# Patient Record
Sex: FEMALE | Race: WHITE | Employment: UNEMPLOYED | URBAN - NONMETROPOLITAN AREA
[De-identification: names, ages, dates, MRNs, and addresses within clinical notes are randomized per-mention and may not be internally consistent; named-entity substitution may affect disease eponyms.]

---

## 2022-07-17 ENCOUNTER — HOSPITAL ENCOUNTER (EMERGENCY)
Age: 27
Discharge: HOME | End: 2022-07-17
Payer: COMMERCIAL

## 2022-07-17 VITALS
DIASTOLIC BLOOD PRESSURE: 82 MMHG | RESPIRATION RATE: 20 BRPM | WEIGHT: 125 LBS | HEIGHT: 61 IN | OXYGEN SATURATION: 98 % | BODY MASS INDEX: 23.6 KG/M2 | HEART RATE: 70 BPM | SYSTOLIC BLOOD PRESSURE: 141 MMHG

## 2022-07-17 DIAGNOSIS — K04.7 DENTAL ABSCESS: Primary | ICD-10-CM

## 2022-07-17 PROCEDURE — 6370000000 HC RX 637 (ALT 250 FOR IP): Performed by: NURSE PRACTITIONER

## 2022-07-17 PROCEDURE — 41800 DRAINAGE OF GUM LESION: CPT

## 2022-07-17 PROCEDURE — 99283 EMERGENCY DEPT VISIT LOW MDM: CPT

## 2022-07-17 RX ORDER — LIDOCAINE HYDROCHLORIDE 20 MG/ML
15 SOLUTION OROPHARYNGEAL ONCE
Status: COMPLETED | OUTPATIENT
Start: 2022-07-17 | End: 2022-07-17

## 2022-07-17 RX ORDER — AMOXICILLIN AND CLAVULANATE POTASSIUM 875; 125 MG/1; MG/1
1 TABLET, FILM COATED ORAL 2 TIMES DAILY
Qty: 20 TABLET | Refills: 0 | Status: SHIPPED | OUTPATIENT
Start: 2022-07-17 | End: 2022-07-27

## 2022-07-17 RX ADMIN — LIDOCAINE HYDROCHLORIDE 15 ML: 20 SOLUTION ORAL; TOPICAL at 03:46

## 2022-07-17 ASSESSMENT — PAIN - FUNCTIONAL ASSESSMENT: PAIN_FUNCTIONAL_ASSESSMENT: 0-10

## 2022-07-17 ASSESSMENT — PAIN SCALES - GENERAL: PAINLEVEL_OUTOF10: 7

## 2022-07-17 NOTE — DISCHARGE INSTRUCTIONS
Salt water gargles and good oral hygiene. It is imperative that you see a dentist as soon as possible. Make sure you finish all of your antibiotics. Return for fever, inability to open your mouth, unable to swallow your secretions or any other concerns.

## 2022-07-17 NOTE — ED NOTES
Pt to ED via private with c/o dental pain from an abscess tooth. Pt states she started taking amoxicillin a couple of days ago and has been alternating ibuprofen and tylenol for pain.  Denies other needs     Shaan Mclean  07/17/22 [id number]

## 2022-07-18 ASSESSMENT — ENCOUNTER SYMPTOMS
COUGH: 0
CHEST TIGHTNESS: 0
ABDOMINAL PAIN: 0
VOMITING: 0
BACK PAIN: 0
RHINORRHEA: 0
NAUSEA: 0
EYE REDNESS: 0

## 2022-07-18 NOTE — ED PROVIDER NOTES
[facility] Emergency Department    CHIEF COMPLAINT       Chief Complaint   Patient presents with    Dental Pain       Nurses Notes reviewed and I agree except as noted in the HPI. HISTORY OF PRESENT ILLNESS    Phoebe Vega steven 32 y.o. female who presents to the ED for evaluation of left upper dental abscess. Patient states it feels like it is going to ruptures. No trismus. No fever. HPI was provided by the patient    REVIEW OF SYSTEMS     Review of Systems   Constitutional:  Negative for chills, fatigue and fever. HENT:  Positive for dental problem. Negative for congestion, ear discharge, ear pain, postnasal drip and rhinorrhea. Eyes:  Negative for redness. Respiratory:  Negative for cough and chest tightness. Cardiovascular:  Negative for chest pain and leg swelling. Gastrointestinal:  Negative for abdominal pain, nausea and vomiting. Genitourinary:  Negative for difficulty urinating, dysuria, enuresis, flank pain and hematuria. Musculoskeletal:  Negative for back pain and joint swelling. Skin:  Negative for rash. Neurological:  Negative for dizziness, light-headedness, numbness and headaches. Psychiatric/Behavioral:  Negative for agitation, behavioral problems and confusion. All other systems negative except as noted. PAST MEDICAL HISTORY     Past Medical History:   Diagnosis Date    Ketoacidosis        SURGICALHISTORY      has no past surgical history on file. CURRENT MEDICATIONS       Discharge Medication List as of 7/17/2022  4:15 AM        CONTINUE these medications which have NOT CHANGED    Details   brompheniramine-pseudoephedrine-DM 30-2-10 MG/5ML syrup Take 10 mLs by mouth 4 times daily as needed for Congestion or Cough, Disp-200 mL, R-0      Insulin Glargine (LANTUS SC) Inject  into the skin. Insulin Aspart (NOVOLOG SC) Inject  into the skin. OMEPRAZOLE by Does not apply route.                ALLERGIES     has No Known Allergies. FAMILY HISTORY     has no family status information on file. family history is not on file. SOCIAL HISTORY       Social History     Socioeconomic History    Marital status: Single     Spouse name: Not on file    Number of children: Not on file    Years of education: Not on file    Highest education level: Not on file   Occupational History    Not on file   Tobacco Use    Smoking status: Every Day     Packs/day: 0.50     Types: Cigarettes    Smokeless tobacco: Not on file   Substance and Sexual Activity    Alcohol use: No    Drug use: Not on file    Sexual activity: Not on file   Other Topics Concern    Not on file   Social History Narrative    Not on file     Social Determinants of Health     Financial Resource Strain: Not on file   Food Insecurity: Not on file   Transportation Needs: Not on file   Physical Activity: Not on file   Stress: Not on file   Social Connections: Not on file   Intimate Partner Violence: Not on file   Housing Stability: Not on file       PHYSICAL EXAM     INITIAL VITALS:  height is 5' 1\" (1.549 m) and weight is 125 lb (56.7 kg). Her blood pressure is 141/82 (abnormal) and her pulse is 70. Her respiration is 20 and oxygen saturation is 98%. Physical Exam  Constitutional:       General: She is not in acute distress. Appearance: She is well-developed. She is not diaphoretic. HENT:      Head: Normocephalic and atraumatic. Nose: Nose normal.      Mouth/Throat:      Mouth: Mucous membranes are moist.      Pharynx: Oropharynx is clear. Eyes:      Conjunctiva/sclera: Conjunctivae normal.   Cardiovascular:      Pulses: Normal pulses. Pulmonary:      Effort: Pulmonary effort is normal.   Musculoskeletal:         General: No deformity. Normal range of motion. Cervical back: Normal range of motion. Skin:     General: Skin is warm and dry. Capillary Refill: Capillary refill takes less than 2 seconds.    Neurological:      General: No focal deficit present. Mental Status: She is alert and oriented to person, place, and time. Psychiatric:         Mood and Affect: Mood normal.         Behavior: Behavior normal.       DIFFERENTIAL DIAGNOSIS:   Dental abscess, dental caries    DIAGNOSTIC RESULTS     EKG: All EKG's are interpreted by the Emergency Department Physician who eithersigns or Co-signs this chart in the absence of a cardiologist.        RADIOLOGY: non-plainfilm images(s) such as CT, Ultrasound and MRI are read by the radiologist.  Plain radiographic images are visualized and preliminarily interpreted by the emergency physician unless otherwise stated below. No orders to display         LABS:   Labs Reviewed - No data to display    EMERGENCY DEPARTMENT COURSE:   Vitals:    Vitals:    07/17/22 0258 07/17/22 0259 07/17/22 0300   BP:   (!) 141/82   Pulse: 98 70    Resp:  20    SpO2:  98%    Weight:  125 lb (56.7 kg)    Height:  5' 1\" (1.549 m)                                       Internal Administration   First Dose      Second Dose           Last COVID Lab No results found for: SARS-COV-2, SARS-COV-2 RNA, SARS-COV-2, SARS-COV-2, SARS-COV-2 BY PCR, SARS-COV-2, SARS-COV-2, SARS-COV-2         MDM    Was seen and evaluated in the emergency room for Dental abscess. Examination of her mouth showed fractured dentition. She also has dental caries. There is no sign of abscess that requires drainage or gross bony involvement. She has been instructed to  Keep her mouth clean, swish with salt water gargles, avoid very hot or very cold foods, and seek dental evaluation in the next 48 hours. She is discharged home with appropriate antibiotic treatment. No notes of  Admission Criteria type on file. Medications   lidocaine viscous hcl (XYLOCAINE) 2 % solution 15 mL (15 mLs Mouth/Throat Given 7/17/22 0300)       Please note that the patient was evaluated during a pandemic.   All efforts were made for HIPPA compliance as well as provision of appropriate care.      Patient was seen independently by myself. The patient's final impression and disposition and plan was determined by myself. Strict return precautions and follow up instructions were discussed with the patient prior to discharge, with which the patient agrees. Physical assessment findings, diagnostic testing(s) if applicable, and vital signs reviewed with patient/patient representative. Questions answered. Medications asdirected, including OTC medications for supportive care. Education provided on medications. Differential diagnosis(s) discussed with patient/patient representative. Home care/self care instructions reviewed withpatient/patient representative. Patient is to follow-up with family care provider in 2-3 days if no improvement. Patient is to go to the emergency department if symptoms worsen. Patient/patient representative isaware of care plan, questions answered, verbalizes understanding and is in agreement.      CRITICAL CARE:   None    CONSULTS:  None    PROCEDURES:  Incision/Drainage    Date/Time: 7/18/2022 3:56 AM  Performed by: FAISAL Barrientos CNP  Authorized by: FAISAL Barrientos CNP     Consent:     Consent obtained:  Verbal    Consent given by:  Patient    Risks, benefits, and alternatives were discussed: yes      Risks discussed:  Bleeding, incomplete drainage, pain, damage to other organs and infection    Alternatives discussed:  No treatment, delayed treatment, alternative treatment, observation and referral  Universal protocol:     Patient identity confirmed:  Verbally with patient  Location:     Type:  Abscess    Location:  Mouth    Mouth location:   space  Sedation:     Sedation type:  None  Anesthesia:     Anesthesia method:  Topical application    Topical anesthetic:  Lidocaine gel  Procedure type:     Complexity:  Simple  Procedure details:     Ultrasound guidance: no      Needle aspiration: yes      Needle size:  18 G    Incision types: Single straight    Incision depth:  Subcutaneous    Wound management:  Probed and deloculated    Drainage:  Bloody and purulent    Drainage amount: Moderate    Wound treatment:  Wound left open    Packing materials:  None  Post-procedure details:     Procedure completion:  Tolerated well, no immediate complications      FINAL IMPRESSION     1. Dental abscess          DISPOSITION/PLAN   DISPOSITION Decision To Discharge 07/17/2022 04:14:17 AM      PATIENT REFERREDTO:  Your Dentist    Schedule an appointment as soon as possible for a visit in 1 day  For follow up    [address] [place] [address]. [address] [place] [address] [address] [place] [address]  Schedule an appointment as soon as possible for a visit in 2 days  For follow up      DISCHARGE MEDICATIONS:  Discharge Medication List as of 7/17/2022  4:15 AM        START taking these medications    Details   amoxicillin-clavulanate (AUGMENTIN) 875-125 MG per tablet Take 1 tablet by mouth in the morning and 1 tablet before bedtime. Do all this for 10 days. , Disp-20 tablet, R-0Print             (Please note that portions of this note were completed with a voice recognition program.  Efforts were made to edit the dictations but occasionally words are mis-transcribed.)         FAISAL Camacho CNP, APRN - CNP  07/18/22 7961

## 2023-09-02 ENCOUNTER — HOSPITAL ENCOUNTER (EMERGENCY)
Age: 28
Discharge: HOME | End: 2023-09-02
Attending: EMERGENCY MEDICINE
Payer: COMMERCIAL

## 2023-09-02 ENCOUNTER — APPOINTMENT (OUTPATIENT)
Dept: ULTRASOUND IMAGING | Age: 28
End: 2023-09-02
Payer: COMMERCIAL

## 2023-09-02 VITALS
WEIGHT: 125 LBS | RESPIRATION RATE: 16 BRPM | TEMPERATURE: 98.5 F | SYSTOLIC BLOOD PRESSURE: 98 MMHG | HEART RATE: 84 BPM | BODY MASS INDEX: 23.6 KG/M2 | DIASTOLIC BLOOD PRESSURE: 70 MMHG | OXYGEN SATURATION: 98 % | HEIGHT: 61 IN

## 2023-09-02 DIAGNOSIS — N93.9 VAGINAL BLEEDING: Primary | ICD-10-CM

## 2023-09-02 LAB
ALBUMIN SERPL BCG-MCNC: 4.5 G/DL (ref 3.5–5.1)
ALP SERPL-CCNC: 67 U/L (ref 38–126)
ALT SERPL W/O P-5'-P-CCNC: 10 U/L (ref 11–66)
ANION GAP SERPL CALC-SCNC: 11 MEQ/L (ref 8–16)
AST SERPL-CCNC: 13 U/L (ref 5–40)
B-HCG SERPL QL: NEGATIVE
BACTERIA URNS QL MICRO: ABNORMAL /HPF
BASOPHILS ABSOLUTE: 0.1 THOU/MM3 (ref 0–0.1)
BASOPHILS NFR BLD AUTO: 1 %
BILIRUB SERPL-MCNC: 0.2 MG/DL (ref 0.3–1.2)
BILIRUB UR QL STRIP.AUTO: NEGATIVE
BUN SERPL-MCNC: 20 MG/DL (ref 7–22)
CALCIUM SERPL-MCNC: 9.1 MG/DL (ref 8.5–10.5)
CASTS #/AREA URNS LPF: ABNORMAL /LPF
CASTS 2: ABNORMAL /LPF
CHARACTER UR: ABNORMAL
CHLORIDE SERPL-SCNC: 100 MEQ/L (ref 98–111)
CO2 SERPL-SCNC: 28 MEQ/L (ref 23–33)
COLOR: YELLOW
CREAT SERPL-MCNC: 0.8 MG/DL (ref 0.4–1.2)
CRYSTALS URNS MICRO: ABNORMAL
DEPRECATED RDW RBC AUTO: 41.7 FL (ref 35–45)
EOSINOPHIL NFR BLD AUTO: 1.9 %
EOSINOPHILS ABSOLUTE: 0.2 THOU/MM3 (ref 0–0.4)
EPITHELIAL CELLS, UA: ABNORMAL /HPF
ERYTHROCYTE [DISTWIDTH] IN BLOOD BY AUTOMATED COUNT: 12.7 % (ref 11.5–14.5)
GFR SERPL CREATININE-BSD FRML MDRD: > 60 ML/MIN/1.73M2
GLUCOSE SERPL-MCNC: 167 MG/DL (ref 70–108)
GLUCOSE UR QL STRIP.AUTO: NEGATIVE MG/DL
HCT VFR BLD AUTO: 38.4 % (ref 37–47)
HGB BLD-MCNC: 12.6 GM/DL (ref 12–16)
HGB UR QL STRIP.AUTO: ABNORMAL
IMM GRANULOCYTES # BLD AUTO: 0.05 THOU/MM3 (ref 0–0.07)
IMM GRANULOCYTES NFR BLD AUTO: 0.5 %
KETONES UR QL STRIP.AUTO: ABNORMAL
KOH PREP SPEC: NORMAL
LYMPHOCYTES ABSOLUTE: 4.1 THOU/MM3 (ref 1–4.8)
LYMPHOCYTES NFR BLD AUTO: 37.8 %
MCH RBC QN AUTO: 29.6 PG (ref 26–33)
MCHC RBC AUTO-ENTMCNC: 32.8 GM/DL (ref 32.2–35.5)
MCV RBC AUTO: 90.4 FL (ref 81–99)
MISCELLANEOUS 2: ABNORMAL
MONOCYTES ABSOLUTE: 0.6 THOU/MM3 (ref 0.4–1.3)
MONOCYTES NFR BLD AUTO: 5.8 %
NEUTROPHILS NFR BLD AUTO: 53 %
NITRITE UR QL STRIP: NEGATIVE
NRBC BLD AUTO-RTO: 0 /100 WBC
OSMOLALITY SERPL CALC.SUM OF ELEC: 284 MOSMOL/KG (ref 275–300)
PH UR STRIP.AUTO: 5 [PH] (ref 5–9)
PLATELET # BLD AUTO: 292 THOU/MM3 (ref 130–400)
PMV BLD AUTO: 9.1 FL (ref 9.4–12.4)
POTASSIUM SERPL-SCNC: 3.9 MEQ/L (ref 3.5–5.2)
PROT SERPL-MCNC: 7.1 G/DL (ref 6.1–8)
PROT UR STRIP.AUTO-MCNC: 100 MG/DL
RBC # BLD AUTO: 4.25 MILL/MM3 (ref 4.2–5.4)
RBC URINE: ABNORMAL /HPF
RENAL EPI CELLS #/AREA URNS HPF: ABNORMAL /[HPF]
SEGMENTED NEUTROPHILS ABSOLUTE COUNT: 5.8 THOU/MM3 (ref 1.8–7.7)
SODIUM SERPL-SCNC: 139 MEQ/L (ref 135–145)
SP GR UR REFRACT.AUTO: 1.02 (ref 1–1.03)
TRICHOMONAS PREP: NORMAL
UROBILINOGEN, URINE: 0.2 EU/DL (ref 0–1)
WBC # BLD AUTO: 10.9 THOU/MM3 (ref 4.8–10.8)
WBC #/AREA URNS HPF: ABNORMAL /HPF
WBC #/AREA URNS HPF: NEGATIVE /[HPF]
YEAST LIKE FUNGI URNS QL MICRO: ABNORMAL

## 2023-09-02 PROCEDURE — 87220 TISSUE EXAM FOR FUNGI: CPT

## 2023-09-02 PROCEDURE — 80053 COMPREHEN METABOLIC PANEL: CPT

## 2023-09-02 PROCEDURE — 84703 CHORIONIC GONADOTROPIN ASSAY: CPT

## 2023-09-02 PROCEDURE — 87210 SMEAR WET MOUNT SALINE/INK: CPT

## 2023-09-02 PROCEDURE — 87205 SMEAR GRAM STAIN: CPT

## 2023-09-02 PROCEDURE — 76830 TRANSVAGINAL US NON-OB: CPT

## 2023-09-02 PROCEDURE — 99284 EMERGENCY DEPT VISIT MOD MDM: CPT

## 2023-09-02 PROCEDURE — 81001 URINALYSIS AUTO W/SCOPE: CPT

## 2023-09-02 PROCEDURE — 85025 COMPLETE CBC W/AUTO DIFF WBC: CPT

## 2023-09-02 PROCEDURE — 87070 CULTURE OTHR SPECIMN AEROBIC: CPT

## 2023-09-02 PROCEDURE — 36415 COLL VENOUS BLD VENIPUNCTURE: CPT

## 2023-09-02 ASSESSMENT — PAIN DESCRIPTION - LOCATION: LOCATION: ABDOMEN

## 2023-09-02 ASSESSMENT — PAIN DESCRIPTION - DESCRIPTORS: DESCRIPTORS: CRAMPING

## 2023-09-02 ASSESSMENT — PAIN DESCRIPTION - ORIENTATION: ORIENTATION: LOWER

## 2023-09-02 ASSESSMENT — PAIN - FUNCTIONAL ASSESSMENT: PAIN_FUNCTIONAL_ASSESSMENT: 0-10

## 2023-09-02 ASSESSMENT — PAIN SCALES - GENERAL: PAINLEVEL_OUTOF10: 2

## 2023-09-02 NOTE — ED PROVIDER NOTES

## 2023-09-02 NOTE — ED TRIAGE NOTES
Pt ambulatory from [facility] with c/o vaginal spotting that began yesterday. Pt states she has not had a period since  after having a vaginal delivery. Pt has not had OBGYN care since pregnancy. Pt states she delivered  due to DKA. Pt denies any blood clots, states she is only spotting lightly. Pt c/o mild lower abdominal cramps which she rates 2/10. Pt denies NV, reports one episode of diarrhea yesterday.

## 2023-09-02 NOTE — ED PROVIDER NOTES
[facility] DEPT  EMERGENCY DEPARTMENT ENCOUNTER      Pt Name: Paloma Bautista  MRN: [id number]  [address] 1995  Date of evaluation: 9/2/2023  Resident Physician: Luis Alberto Moon DO  Supervising Physician: Dr. Natalie Thomson MD    [address]       Chief Complaint   Patient presents with    Vaginal Bleeding     No period x 9 years after delivering baby        Erickson Bautista is a 32 y.o. female with past medical history of diabetes who presents to the emergency department for evaluation of vaginal bleeding. Patient reports her last menstrual period was in March 2014 before she got pregnant with her child. She has not been to a gynecologist regarding her amenorrhea. Vaginal bleeding started yesterday, reports pea-sized spotting, she sees blood on her underwear and blood with her urine. Has a cramping and has not taken anything for the pain  Has a history of cysts in her ovaries teenager but has not got it checked out. No history of STDs no concern for being tested today  As a teenager she was diagnosed with juvenile diabetes was put on metformin. Patient is not on any hormones, birth controls, she is sexually active. The patient has no other acute complaints at this time. PAST MEDICAL AND SURGICAL HISTORY     Past Medical History:   Diagnosis Date    Diabetes mellitus ([address])     Ketoacidosis      Past Surgical History:   Procedure Laterality Date    COSMETIC SURGERY Left     left cheek       MEDICATIONS   No current facility-administered medications for this encounter.     Current Outpatient Medications:     metFORMIN (GLUCOPHAGE) 1000 MG tablet, Take 1 tablet by mouth 2 times daily (with meals), Disp: , Rfl:     Previous Medications    METFORMIN (GLUCOPHAGE) 1000 MG TABLET    Take 1 tablet by mouth 2 times daily (with meals)       SOCIAL HISTORY     Social History     Social History Narrative    Not on file     Social History discussed with specialties other than Emergency Medicine: Not Applicable    ED COURSE   ED Medications administered this visit (None if left blank):   Medications - No data to display    ED Course as of 09/02/23 2039   Sat Sep 02, 2023   1842 Preg, Serum: NEGATIVE [SJ]   1842 Blood, Urine(!): LARGE [SJ]   2034 US NON OB TRANSVAGINAL  1. There is an avascular cyst at the right ovary measuring up to 2.7 cm in maximum dimension. 2. There is a small amount of fluid within the endometrium. [SJ]      ED Course User Index  [SJ] Andrez Callejas, DO       CRITICAL CARE:  none    PROCEDURES: (None if blank)  Procedures:     MEDICATION CHANGES     New Prescriptions    No medications on file       FINAL DISPOSITION and   [address] Decision Making  Nikki Vincent is a 32y.o. years old patient arrived to the emergency department is nontoxic appearing   Presents complaining of vaginal bleeding. Menstrual period was in 2014 for her pregnancy, patient has been amenorrhea since, has not been seen doctor regarding this. Bleeding started yesterday has spotting with cramping. During ED observation patient was given the appropriate work-up, pertinent results can be found in the ED course/results. Pertinent results:   UA showed blood  Pelvic exam showed blood in the vaginal canal  pelvic ultrasound results show 1. There is an avascular cyst at the right ovary measuring up to 2.7 cm in maximum dimension. 2. There is a small amount of fluid within the endometrium. Since results were discussed with her, at this point I recommended her to follow-up with an OB/GYN regarding this cyst and the amount of fluid found in the endometrium. Told her for the pain take Tylenol Motrin over-the-counter, if her pain gets worse if she has any clots come back to the emergency department.                  Shared Decision-Making was performed, disposition discussed with the patient/family and questions

## 2023-09-03 LAB
BACTERIA GENITAL AEROBE CULT: NORMAL
GRAM STN GENITAL: NORMAL

## 2023-09-03 NOTE — DISCHARGE INSTRUCTIONS
Call today or tomorrow to follow up with No primary care provider on file. or your OB / GYN in 2 days    Potential miscarriage has not been ruled out in your case of pregnancy. Use ibuprofen or Tylenol (unless prescribed medications that have Tylenol in it) for pain. You can take over the counter Ibuprofen (advil) tablets (4 tablets every 8 hours or 3 tablets every 6 hours or 2 tablets every 4 hours)    Return to the Emergency Department for worsening of vaginal bleeding, using more than 4 pads per hour, feeling of weakness, dizzy, nausea / vomiting, any other care or concern.

## 2023-09-05 LAB
BACTERIA GENITAL AEROBE CULT: NORMAL
GRAM STN GENITAL: NORMAL